# Patient Record
Sex: FEMALE | Race: WHITE | NOT HISPANIC OR LATINO | URBAN - METROPOLITAN AREA
[De-identification: names, ages, dates, MRNs, and addresses within clinical notes are randomized per-mention and may not be internally consistent; named-entity substitution may affect disease eponyms.]

---

## 2020-08-19 ENCOUNTER — TELEPHONE (OUTPATIENT)
Dept: HEMATOLOGY ONCOLOGY | Facility: CLINIC | Age: 70
End: 2020-08-19

## 2020-08-19 NOTE — TELEPHONE ENCOUNTER
New Patient Encounter    New Patient Intake Form   Patient Details: Jasmeet Hillman  1950  [id number]    Background Information:  [address] starts by opening a telephone encounter and gathering the following information   Who is calling to schedule? If not self, relationship to patient? grandchild   Referring Provider friend   What is the diagnosis? NHL   Is this diagnosis confirmed? Yes   When was the diagnosis? 8/2020   Is there a confirmed diagnosis from a biopsy/tissue reviewed by pathology? yes   Were outside slides requested? No   Is patient aware of diagnosis? Yes   Is there a personal history and what kind? no   Is there a family history and what kind? Siblings with unknown cancers   Reason for visit? New Diagnosis   Have you had any imaging or labs done? If so: when, where? yes  [address] records in [employer]? no   If patient has a prior history of breast cancer were old records obtained? NA   Was the patient told to bring a disk? yes   Does the patient smoke or Vape? no   If yes, how many packs or cartridges per day? Quit 30 yrs ago   Scheduling Information:   Preferred [facility]:  Ami Clark and Estrella Payne     Are there any dates/time the patient cannot be seen? Miscellaneous: pt scheduled to start tx 8/20/2020,  A friend recommended Dr Bay Joshi  They will request records be sent to my attn  Pt has Medicare and BC/BS, will call me with complete information   After completing the above information, please route to Financial Counselor and the appropriate Nurse Navigator for review